# Patient Record
Sex: MALE | Race: WHITE | Employment: OTHER | URBAN - METROPOLITAN AREA
[De-identification: names, ages, dates, MRNs, and addresses within clinical notes are randomized per-mention and may not be internally consistent; named-entity substitution may affect disease eponyms.]

---

## 2023-07-19 ENCOUNTER — HOSPITAL ENCOUNTER (EMERGENCY)
Facility: HOSPITAL | Age: 68
Discharge: HOME | End: 2023-07-19
Attending: EMERGENCY MEDICINE
Payer: COMMERCIAL

## 2023-07-19 ENCOUNTER — APPOINTMENT (EMERGENCY)
Dept: CT IMAGING | Facility: HOSPITAL | Age: 68
End: 2023-07-19
Payer: COMMERCIAL

## 2023-07-19 VITALS
TEMPERATURE: 97.5 F | RESPIRATION RATE: 18 BRPM | WEIGHT: 180 LBS | HEART RATE: 88 BPM | SYSTOLIC BLOOD PRESSURE: 153 MMHG | OXYGEN SATURATION: 96 % | HEIGHT: 68 IN | DIASTOLIC BLOOD PRESSURE: 91 MMHG | BODY MASS INDEX: 27.28 KG/M2

## 2023-07-19 DIAGNOSIS — S01.81XA LACERATION OF PERIORBITAL AREA, INITIAL ENCOUNTER: ICD-10-CM

## 2023-07-19 DIAGNOSIS — H11.32 SUBCONJUNCTIVAL HEMORRHAGE OF LEFT EYE: Primary | ICD-10-CM

## 2023-07-19 DIAGNOSIS — H05.239 RETROBULBAR HEMORRHAGE: ICD-10-CM

## 2023-07-19 LAB
ANION GAP SERPL CALCULATED.3IONS-SCNC: 6 MMOL/L
BASOPHILS # BLD AUTO: 0.04 THOUSANDS/ÂΜL (ref 0–0.1)
BASOPHILS NFR BLD AUTO: 0 % (ref 0–1)
BUN SERPL-MCNC: 19 MG/DL (ref 5–25)
CALCIUM SERPL-MCNC: 9.3 MG/DL (ref 8.4–10.2)
CHLORIDE SERPL-SCNC: 106 MMOL/L (ref 96–108)
CO2 SERPL-SCNC: 27 MMOL/L (ref 21–32)
CREAT SERPL-MCNC: 0.83 MG/DL (ref 0.6–1.3)
EOSINOPHIL # BLD AUTO: 0.13 THOUSAND/ÂΜL (ref 0–0.61)
EOSINOPHIL NFR BLD AUTO: 1 % (ref 0–6)
ERYTHROCYTE [DISTWIDTH] IN BLOOD BY AUTOMATED COUNT: 12.8 % (ref 11.6–15.1)
GFR SERPL CREATININE-BSD FRML MDRD: 91 ML/MIN/1.73SQ M
GLUCOSE SERPL-MCNC: 99 MG/DL (ref 65–140)
HCT VFR BLD AUTO: 49 % (ref 36.5–49.3)
HGB BLD-MCNC: 16.5 G/DL (ref 12–17)
IMM GRANULOCYTES # BLD AUTO: 0.07 THOUSAND/UL (ref 0–0.2)
IMM GRANULOCYTES NFR BLD AUTO: 1 % (ref 0–2)
LYMPHOCYTES # BLD AUTO: 1.5 THOUSANDS/ÂΜL (ref 0.6–4.47)
LYMPHOCYTES NFR BLD AUTO: 16 % (ref 14–44)
MCH RBC QN AUTO: 29.6 PG (ref 26.8–34.3)
MCHC RBC AUTO-ENTMCNC: 33.7 G/DL (ref 31.4–37.4)
MCV RBC AUTO: 88 FL (ref 82–98)
MONOCYTES # BLD AUTO: 0.72 THOUSAND/ÂΜL (ref 0.17–1.22)
MONOCYTES NFR BLD AUTO: 8 % (ref 4–12)
NEUTROPHILS # BLD AUTO: 7.01 THOUSANDS/ÂΜL (ref 1.85–7.62)
NEUTS SEG NFR BLD AUTO: 74 % (ref 43–75)
NRBC BLD AUTO-RTO: 0 /100 WBCS
PLATELET # BLD AUTO: 219 THOUSANDS/UL (ref 149–390)
PMV BLD AUTO: 10.4 FL (ref 8.9–12.7)
POTASSIUM SERPL-SCNC: 4.3 MMOL/L (ref 3.5–5.3)
RBC # BLD AUTO: 5.58 MILLION/UL (ref 3.88–5.62)
SODIUM SERPL-SCNC: 139 MMOL/L (ref 135–147)
WBC # BLD AUTO: 9.47 THOUSAND/UL (ref 4.31–10.16)

## 2023-07-19 PROCEDURE — 99285 EMERGENCY DEPT VISIT HI MDM: CPT

## 2023-07-19 PROCEDURE — 85025 COMPLETE CBC W/AUTO DIFF WBC: CPT

## 2023-07-19 PROCEDURE — 90471 IMMUNIZATION ADMIN: CPT

## 2023-07-19 PROCEDURE — 96360 HYDRATION IV INFUSION INIT: CPT

## 2023-07-19 PROCEDURE — 36415 COLL VENOUS BLD VENIPUNCTURE: CPT

## 2023-07-19 PROCEDURE — 96361 HYDRATE IV INFUSION ADD-ON: CPT

## 2023-07-19 PROCEDURE — 80048 BASIC METABOLIC PNL TOTAL CA: CPT

## 2023-07-19 PROCEDURE — 99284 EMERGENCY DEPT VISIT MOD MDM: CPT

## 2023-07-19 PROCEDURE — 70486 CT MAXILLOFACIAL W/O DYE: CPT

## 2023-07-19 PROCEDURE — 90715 TDAP VACCINE 7 YRS/> IM: CPT

## 2023-07-19 PROCEDURE — G1004 CDSM NDSC: HCPCS

## 2023-07-19 RX ORDER — TETRACAINE HYDROCHLORIDE 5 MG/ML
2 SOLUTION OPHTHALMIC ONCE
Status: COMPLETED | OUTPATIENT
Start: 2023-07-19 | End: 2023-07-19

## 2023-07-19 RX ORDER — ERYTHROMYCIN 5 MG/G
OINTMENT OPHTHALMIC EVERY 6 HOURS SCHEDULED
Qty: 20 G | Refills: 0 | Status: SHIPPED | OUTPATIENT
Start: 2023-07-19 | End: 2023-07-24

## 2023-07-19 RX ORDER — ERYTHROMYCIN 5 MG/G
0.5 OINTMENT OPHTHALMIC ONCE
Status: COMPLETED | OUTPATIENT
Start: 2023-07-19 | End: 2023-07-19

## 2023-07-19 RX ADMIN — SODIUM CHLORIDE 500 ML: 0.9 INJECTION, SOLUTION INTRAVENOUS at 16:33

## 2023-07-19 RX ADMIN — FLUORESCEIN SODIUM 1 STRIP: 1 STRIP OPHTHALMIC at 16:30

## 2023-07-19 RX ADMIN — TETRACAINE HYDROCHLORIDE 2 DROP: 5 SOLUTION OPHTHALMIC at 16:30

## 2023-07-19 RX ADMIN — TETANUS TOXOID, REDUCED DIPHTHERIA TOXOID AND ACELLULAR PERTUSSIS VACCINE, ADSORBED 0.5 ML: 5; 2.5; 8; 8; 2.5 SUSPENSION INTRAMUSCULAR at 16:31

## 2023-07-19 RX ADMIN — ERYTHROMYCIN 0.5 INCH: 5 OINTMENT OPHTHALMIC at 18:21

## 2023-07-19 NOTE — ED PROVIDER NOTES
History  Chief Complaint   Patient presents with   • Eye Injury     Patient presents to ED with left eye injury having being hit with a metal pipe with plastic cover. No LOC     This is a 80 YOM who presents to the ED for evaluation of an injury to his left eye from approximately 2 hours prior. Patient works as a  and states he slipped on hydraulic fluid earlier today while at work and struck the right side of his face and his right eye, and extension cord revealed. Denies any known penetrating injury to the eye but states he is not sure. States the area around his eye was initially bleeding but spontaneously stopped bleeding. He reports intermittent blurred vision in the left eye. States when both eyes are open he does have intermittent double vision though when the right eye (unaffected eye) is closed he reports normal vision out of his left eye. Also reports mild photophobia of the affected eye. Reports he does have left eye pain that reports the pain feels like a mild dull ache, 3/10 in severity. He denies any other acute concerns or complaints at this time. He is not on blood thinners, does not wear glasses or contact lenses. None       No past medical history on file. No past surgical history on file. No family history on file. I have reviewed and agree with the history as documented. No existing history information found. No existing history information found. Review of Systems   Eyes: Positive for photophobia, pain and visual disturbance. Neurological: Negative for dizziness, syncope, light-headedness and headaches. All other systems reviewed and are negative. Physical Exam  Physical Exam  Vitals and nursing note reviewed. Constitutional:       General: He is not in acute distress. Appearance: He is well-developed. He is not ill-appearing. HENT:      Head: Normocephalic and atraumatic.    Eyes:      General: Lids are normal.      Intraocular pressure: Right eye pressure is 14 mmHg. Left eye pressure is 22 mmHg. Extraocular Movements: Extraocular movements intact. Right eye: Normal extraocular motion and no nystagmus. Left eye: Normal extraocular motion and no nystagmus. Conjunctiva/sclera:      Right eye: Right conjunctiva is not injected. No hemorrhage. Left eye: Left conjunctiva is injected. Hemorrhage present. Pupils: Pupils are equal, round, and reactive to light. Left eye: Fluorescein uptake present. No corneal abrasion. Juan exam negative. Funduscopic exam:     Right eye: Red reflex present. Left eye: Red reflex present. Comments: Examination patient's left eye does show some conjunctival hemorrhage. Pupils are PERRL, no pain or nystagmus with eye movement, no signs of eye entrapment. No signs of corneal abrasion though there is fluorescein uptake in the lateral aspect of the scleral conjunctiva. Cardiovascular:      Rate and Rhythm: Normal rate and regular rhythm. Heart sounds: No murmur heard. Pulmonary:      Effort: Pulmonary effort is normal. No respiratory distress. Breath sounds: Normal breath sounds. Abdominal:      Palpations: Abdomen is soft. Tenderness: There is no abdominal tenderness. Musculoskeletal:         General: No swelling. Cervical back: Neck supple. Skin:     General: Skin is warm and dry. Capillary Refill: Capillary refill takes less than 2 seconds. Neurological:      General: No focal deficit present. Mental Status: He is alert and oriented to person, place, and time.    Psychiatric:         Mood and Affect: Mood normal.                                     Vital Signs  ED Triage Vitals   Temperature Pulse Respirations Blood Pressure SpO2   07/19/23 1502 07/19/23 1502 07/19/23 1502 07/19/23 1502 07/19/23 1502   97.5 °F (36.4 °C) 98 16 (!) 152/106 98 %      Temp Source Heart Rate Source Patient Position - Orthostatic VS BP Location FiO2 (%)   07/19/23 1502 07/19/23 1502 07/19/23 1712 07/19/23 1712 --   Temporal Monitor Lying Right arm       Pain Score       --                  Vitals:    07/19/23 1502 07/19/23 1712 07/19/23 1845   BP: (!) 152/106 (!) 171/107 153/91   Pulse: 98 86 88   Patient Position - Orthostatic VS:  Lying Sitting         Visual Acuity      ED Medications  Medications   sodium chloride 0.9 % bolus 500 mL (0 mL Intravenous Stopped 7/19/23 1823)   fluorescein sodium sterile ophthalmic strip 1 strip (1 strip Left Eye Given by Other 7/19/23 1630)   tetracaine 0.5 % ophthalmic solution 2 drop (2 drops Left Eye Given by Other 7/19/23 1630)   tetanus-diphtheria-acellular pertussis (BOOSTRIX) IM injection 0.5 mL (0.5 mL Intramuscular Given 7/19/23 1631)   erythromycin (ILOTYCIN) 0.5 % ophthalmic ointment 0.5 inch (0.5 inches Left Eye Given 7/19/23 1821)       Diagnostic Studies  Results Reviewed     Procedure Component Value Units Date/Time    Basic metabolic panel [[id number]] Collected: 07/19/23 1708    Lab Status: Final result Specimen: Blood from Arm, Left Updated: 07/19/23 1731     Sodium 139 mmol/L      Potassium 4.3 mmol/L      Chloride 106 mmol/L      CO2 27 mmol/L      ANION GAP 6 mmol/L      BUN 19 mg/dL      Creatinine 0.83 mg/dL      Glucose 99 mg/dL      Calcium 9.3 mg/dL      eGFR 91 ml/min/1.73sq m     Narrative:      [place] guidelines for Chronic Kidney Disease (CKD):   •  Stage 1 with normal or high GFR (GFR > 90 mL/min/1.73 square meters)  •  Stage 2 Mild CKD (GFR = 60-89 mL/min/1.73 square meters)  •  Stage 3A Moderate CKD (GFR = 45-59 mL/min/1.73 square meters)  •  Stage 3B Moderate CKD (GFR = 30-44 mL/min/1.73 square meters)  •  Stage 4 Severe CKD (GFR = 15-29 mL/min/1.73 square meters)  •  Stage 5 End Stage CKD (GFR <15 mL/min/1.73 square meters)  Note: GFR calculation is accurate only with a steady state creatinine    CBC and differential [[id number]] Collected: 07/19/23 1624    Lab Status: Final result Specimen: Blood from Arm, Left Updated: 07/19/23 1629     WBC 9.47 Thousand/uL      RBC 5.58 Million/uL      Hemoglobin 16.5 g/dL      Hematocrit 49.0 %      MCV 88 fL      MCH 29.6 pg      MCHC 33.7 g/dL      RDW 12.8 %      MPV 10.4 fL      Platelets 532 Thousands/uL      nRBC 0 /100 WBCs      Neutrophils Relative 74 %      Immat GRANS % 1 %      Lymphocytes Relative 16 %      Monocytes Relative 8 %      Eosinophils Relative 1 %      Basophils Relative 0 %      Neutrophils Absolute 7.01 Thousands/µL      Immature Grans Absolute 0.07 Thousand/uL      Lymphocytes Absolute 1.50 Thousands/µL      Monocytes Absolute 0.72 Thousand/µL      Eosinophils Absolute 0.13 Thousand/µL      Basophils Absolute 0.04 Thousands/µL                  CT facial bones without contrast   Final Result by Nahun Abreu MD (07/19 1736)      Subtle wispy soft tissue density at the posterior margin of the sclera within the intraconal fat of the left orbit. This probably represents a small amount of wispy retrobulbar hemorrhage. Ophthalmology consult and close evaluation and follow-up of intraocular pressures is recommended. This examination was marked "immediate notification" in Epic in order to begin the standard process by which the radiology reading room liaison alerts the referring practitioner. Workstation performed: [id number]                    Procedures  Procedures         ED Course  ED Course as of 07/19/23 1919 Wed Jul 19, 2023 1758 CT facial bones without contrast  IMPRESSION:     Subtle wispy soft tissue density at the posterior margin of the sclera within the intraconal fat of the left orbit. This probably represents a small amount of wispy retrobulbar hemorrhage.     Ophthalmology consult and close evaluation and follow-up of intraocular pressures is recommended.    1801 Discussed case with Dr. Valeria Miles with ophthalmology who advised patient okay for discharge with office follow-up tomorrow morning. Will discharge patient with strict ED return precautions, erythromycin ointment. Medical Decision Making  44-year-old male presents the ED for evaluation status post traumatic injury of left eye. Patient was some conjunctival hemorrhage on exam, vision grossly intact bilaterally. Patient's labs acutely unremarkable. CT facial bones with contrast was concerning for retrobulbar hemorrhage. Spoke with Dr. Carina Millard with ophthalmology via [facility] text who advised patient okay for discharge with close follow-up. Advised to have patient call office tomorrow morning for same-day appointment. Patient given erythromycin prescription, first dose in ED, remaining prescription sent to pharmacy. Patient given contact information for ophthalmology, advised to call first thing tomorrow morning. Very strict ED return precautions discussed with patient. Patient verbalized understanding and agreement with plan. Amount and/or Complexity of Data Reviewed  Labs: ordered. Radiology: ordered. Decision-making details documented in ED Course. Risk  Prescription drug management.           Disposition  Final diagnoses:   Subconjunctival hemorrhage of left eye   Retrobulbar hemorrhage - left   Laceration of periorbital area, initial encounter     Time reflects when diagnosis was documented in both MDM as applicable and the Disposition within this note     Time User Action Codes Description Comment    7/19/2023  6:55 PM Magdalena [employer] Add [D95.89VT] Traumatic injury of globe of left eye     7/19/2023  6:55 PM Magdalena Haw Remove [I85.49JE] Traumatic injury of globe of left eye     7/19/2023  6:55 PM Magdalena Haw Add [H11.32] Subconjunctival hemorrhage of left eye     7/19/2023  6:55 PM Magdalena Haw Add [P47.112] Retrobulbar hemorrhage     7/19/2023  6:55 PM Magdalena Haw Modify [R64.104] Retrobulbar hemorrhage left    7/19/2023  6:55 PM Magdalena Haw Add [S01.81XA] Laceration of periorbital area, initial encounter       ED Disposition     ED Disposition   Discharge    Condition   Stable    Date/Time   Wed Jul 19, 2023  7:02 PM    Comment   William Calle discharge to home/self care. Follow-up Information     Follow up With Specialties Details Why Contact Info    Melanie Pearce MD Ophthalmology Schedule an appointment as soon as possible for a visit  For re-check [address]  [phone]            Patient's Medications   Discharge Prescriptions    ERYTHROMYCIN (ILOTYCIN) OPHTHALMIC OINTMENT    Administer into the left eye every 6 (six) hours for 5 days Place a 1/2 inch ribbon of ointment into the lower eyelid. Also apply to periorbital area over laceration on upper eyelid.        Start Date: 7/19/2023 End Date: 7/24/2023       Order Dose: --       Quantity: 20 g    Refills: 0           PDMP Review     None          ED Provider  Electronically Signed by           Torsten Castillo PA-C  07/19/23 1919

## 2023-07-19 NOTE — DISCHARGE INSTRUCTIONS
Call Dr Sp Low office tomorrow morning at [facility], [place], operated by [facility]. [phone]. Apply erythromycin ointment as directed. Return to the ED if you develop any worsening symptoms discussed prior to your discharge.